# Patient Record
Sex: FEMALE | Race: WHITE | NOT HISPANIC OR LATINO | Employment: OTHER | URBAN - METROPOLITAN AREA
[De-identification: names, ages, dates, MRNs, and addresses within clinical notes are randomized per-mention and may not be internally consistent; named-entity substitution may affect disease eponyms.]

---

## 2017-02-20 PROBLEM — L97.502 DIABETIC ULCER OF FOOT WITH FAT LAYER EXPOSED: Status: ACTIVE | Noted: 2017-02-20

## 2017-02-20 PROBLEM — E11.621 DIABETIC ULCER OF FOOT WITH FAT LAYER EXPOSED: Status: ACTIVE | Noted: 2017-02-20

## 2017-03-17 PROBLEM — M86.60 CHRONIC OSTEOMYELITIS: Status: ACTIVE | Noted: 2017-03-17

## 2017-06-06 PROBLEM — R53.1 WEAKNESS: Status: ACTIVE | Noted: 2017-06-06

## 2017-06-07 PROBLEM — E10.40 DIABETIC NEUROPATHY ASSOCIATED WITH TYPE 1 DIABETES MELLITUS: Status: ACTIVE | Noted: 2017-06-07

## 2017-06-07 PROBLEM — E11.628 TYPE 2 DIABETES MELLITUS WITH SKIN COMPLICATION: Status: ACTIVE | Noted: 2017-06-07

## 2017-06-07 PROBLEM — E87.6 HYPOKALEMIA: Status: ACTIVE | Noted: 2017-06-07

## 2017-06-07 PROBLEM — R53.1 WEAKNESS: Chronic | Status: ACTIVE | Noted: 2017-06-06

## 2017-06-07 PROBLEM — I89.0 LYMPHEDEMA OF BOTH LOWER EXTREMITIES: Status: ACTIVE | Noted: 2017-06-07

## 2017-06-07 PROBLEM — E63.9 INADEQUATE DIETARY ENERGY INTAKE: Status: ACTIVE | Noted: 2017-06-07

## 2017-06-08 PROBLEM — E87.6 HYPOKALEMIA: Status: RESOLVED | Noted: 2017-06-07 | Resolved: 2017-06-08

## 2017-06-08 PROBLEM — L03.119 CELLULITIS OF LEG: Status: ACTIVE | Noted: 2017-06-08

## 2017-06-09 PROBLEM — E10.40 DIABETIC NEUROPATHY ASSOCIATED WITH TYPE 1 DIABETES MELLITUS: Status: RESOLVED | Noted: 2017-06-07 | Resolved: 2017-06-09

## 2017-06-09 PROBLEM — E11.40 DIABETIC NEUROPATHY ASSOCIATED WITH TYPE 2 DIABETES MELLITUS: Status: ACTIVE | Noted: 2017-06-09

## 2017-12-25 PROBLEM — R53.1 GENERALIZED WEAKNESS: Status: ACTIVE | Noted: 2017-12-25

## 2017-12-27 PROBLEM — E87.6 HYPOKALEMIA: Status: ACTIVE | Noted: 2017-12-27

## 2017-12-28 PROBLEM — E87.6 HYPOKALEMIA: Status: RESOLVED | Noted: 2017-12-27 | Resolved: 2017-12-28

## 2018-01-12 PROBLEM — R09.02 HYPOXEMIA: Status: ACTIVE | Noted: 2018-01-12

## 2018-01-12 PROBLEM — N30.00 ACUTE CYSTITIS WITHOUT HEMATURIA: Status: ACTIVE | Noted: 2018-01-12

## 2018-01-12 PROBLEM — I50.23 ACUTE ON CHRONIC SYSTOLIC CONGESTIVE HEART FAILURE: Status: ACTIVE | Noted: 2018-01-12

## 2018-01-14 PROBLEM — R19.7 DIARRHEA: Status: ACTIVE | Noted: 2018-01-14

## 2018-01-14 PROBLEM — R11.0 NAUSEA: Status: ACTIVE | Noted: 2018-01-14

## 2018-01-15 PROBLEM — R11.0 NAUSEA: Status: RESOLVED | Noted: 2018-01-14 | Resolved: 2018-01-15

## 2018-01-15 PROBLEM — R19.7 DIARRHEA: Status: RESOLVED | Noted: 2018-01-14 | Resolved: 2018-01-15

## 2018-01-17 PROBLEM — I10 HBP (HIGH BLOOD PRESSURE): Status: ACTIVE | Noted: 2018-01-17

## 2018-04-08 PROBLEM — R41.82 ALTERED MENTAL STATUS: Status: ACTIVE | Noted: 2018-04-08

## 2018-04-09 PROBLEM — R63.8 INCREASED NUTRITIONAL NEEDS: Status: ACTIVE | Noted: 2018-04-09

## 2018-04-09 PROBLEM — T14.8XXA DEEP TISSUE INJURY: Status: ACTIVE | Noted: 2018-04-09

## 2018-04-09 PROBLEM — N39.0 ACUTE UTI: Status: ACTIVE | Noted: 2018-04-09

## 2018-06-18 PROBLEM — E86.0 DEHYDRATION: Status: ACTIVE | Noted: 2018-06-18

## 2018-06-18 PROBLEM — N17.9 AKI (ACUTE KIDNEY INJURY): Status: ACTIVE | Noted: 2018-06-18

## 2018-10-10 PROBLEM — L89.613 PRESSURE INJURY OF RIGHT HEEL, STAGE 3: Status: ACTIVE | Noted: 2018-10-10

## 2018-10-10 PROBLEM — R63.4 WEIGHT LOSS, UNINTENTIONAL: Status: ACTIVE | Noted: 2018-10-10

## 2018-10-10 PROBLEM — R63.8 ALTERATION IN NUTRITION: Status: ACTIVE | Noted: 2018-10-10

## 2018-10-16 PROBLEM — R63.8 INCREASED NUTRITIONAL NEEDS: Status: ACTIVE | Noted: 2018-10-16

## 2018-11-12 PROBLEM — N39.0 URINARY TRACT INFECTION ASSOCIATED WITH INDWELLING URETHRAL CATHETER: Status: ACTIVE | Noted: 2018-11-12

## 2018-11-12 PROBLEM — T83.511A URINARY TRACT INFECTION ASSOCIATED WITH INDWELLING URETHRAL CATHETER: Status: ACTIVE | Noted: 2018-11-12

## 2018-11-13 PROBLEM — R50.9 FEVER: Status: ACTIVE | Noted: 2018-11-13

## 2018-11-15 PROBLEM — L89.93 PRESSURE INJURY, STAGE 3: Status: ACTIVE | Noted: 2018-11-15

## 2018-11-15 PROBLEM — L89.92 PRESSURE INJURY, STAGE 2: Status: ACTIVE | Noted: 2018-11-15

## 2019-01-22 PROBLEM — L03.90 WOUND CELLULITIS: Status: ACTIVE | Noted: 2019-01-22

## 2019-01-23 PROBLEM — D63.8 ANEMIA OF CHRONIC DISEASE: Status: ACTIVE | Noted: 2019-01-23

## 2019-01-25 PROBLEM — Z89.9 S/P AMPUTATION: Status: ACTIVE | Noted: 2019-01-25

## 2019-03-06 PROBLEM — L03.116 CELLULITIS OF LEFT FOOT: Status: ACTIVE | Noted: 2019-03-06

## 2019-03-07 PROBLEM — I96 TOE NECROSIS: Status: ACTIVE | Noted: 2019-03-07

## 2019-03-08 PROBLEM — L89.95 UNSTAGEABLE PRESSURE INJURY OF SKIN AND TISSUE: Status: ACTIVE | Noted: 2019-03-08

## 2019-03-08 PROBLEM — R53.2 FUNCTIONAL QUADRIPLEGIA: Status: ACTIVE | Noted: 2019-03-08

## 2019-03-26 ENCOUNTER — HOSPITAL ENCOUNTER (EMERGENCY)
Facility: HOSPITAL | Age: 79
Discharge: HOME | End: 2019-03-26
Attending: FAMILY MEDICINE
Payer: MEDICARE

## 2019-03-26 VITALS
DIASTOLIC BLOOD PRESSURE: 60 MMHG | TEMPERATURE: 97 F | HEART RATE: 86 BPM | RESPIRATION RATE: 24 BRPM | SYSTOLIC BLOOD PRESSURE: 121 MMHG | OXYGEN SATURATION: 97 %

## 2019-03-26 DIAGNOSIS — I95.9 HYPOTENSION: Primary | ICD-10-CM

## 2019-03-26 DIAGNOSIS — G89.18 POST-OPERATIVE PAIN: ICD-10-CM

## 2019-03-26 LAB
ALBUMIN SERPL BCP-MCNC: 2.2 G/DL (ref 3.5–5.2)
ALP SERPL-CCNC: 121 U/L (ref 55–135)
ALT SERPL W/O P-5'-P-CCNC: 8 U/L (ref 10–44)
ANION GAP SERPL CALC-SCNC: 6 MMOL/L (ref 8–16)
AST SERPL-CCNC: 15 U/L (ref 10–40)
BACTERIA #/AREA URNS HPF: ABNORMAL /HPF
BASOPHILS # BLD AUTO: 0.03 K/UL (ref 0–0.2)
BASOPHILS NFR BLD: 0.2 % (ref 0–1.9)
BILIRUB SERPL-MCNC: 0.7 MG/DL (ref 0.1–1)
BILIRUB UR QL STRIP: NEGATIVE
BNP SERPL-MCNC: 1020 PG/ML (ref 0–99)
BUN SERPL-MCNC: 8 MG/DL (ref 8–23)
CALCIUM SERPL-MCNC: 9.4 MG/DL (ref 8.7–10.5)
CHLORIDE SERPL-SCNC: 104 MMOL/L (ref 95–110)
CLARITY UR: CLEAR
CO2 SERPL-SCNC: 27 MMOL/L (ref 23–29)
COLOR UR: YELLOW
CREAT SERPL-MCNC: 0.7 MG/DL (ref 0.5–1.4)
DIFFERENTIAL METHOD: ABNORMAL
EOSINOPHIL # BLD AUTO: 0.3 K/UL (ref 0–0.5)
EOSINOPHIL NFR BLD: 2.6 % (ref 0–8)
ERYTHROCYTE [DISTWIDTH] IN BLOOD BY AUTOMATED COUNT: 16.3 % (ref 11.5–14.5)
EST. GFR  (AFRICAN AMERICAN): >60 ML/MIN/1.73 M^2
EST. GFR  (NON AFRICAN AMERICAN): >60 ML/MIN/1.73 M^2
GLUCOSE SERPL-MCNC: 112 MG/DL (ref 70–110)
GLUCOSE UR QL STRIP: NEGATIVE
HCT VFR BLD AUTO: 27.8 % (ref 37–48.5)
HGB BLD-MCNC: 8.9 G/DL (ref 12–16)
HGB UR QL STRIP: NEGATIVE
KETONES UR QL STRIP: NEGATIVE
LEUKOCYTE ESTERASE UR QL STRIP: ABNORMAL
LYMPHOCYTES # BLD AUTO: 1.5 K/UL (ref 1–4.8)
LYMPHOCYTES NFR BLD: 12.2 % (ref 18–48)
MCH RBC QN AUTO: 30.5 PG (ref 27–31)
MCHC RBC AUTO-ENTMCNC: 32 G/DL (ref 32–36)
MCV RBC AUTO: 95 FL (ref 82–98)
MICROSCOPIC COMMENT: ABNORMAL
MONOCYTES # BLD AUTO: 0.8 K/UL (ref 0.3–1)
MONOCYTES NFR BLD: 6.3 % (ref 4–15)
NEUTROPHILS # BLD AUTO: 9.8 K/UL (ref 1.8–7.7)
NEUTROPHILS NFR BLD: 78.7 % (ref 38–73)
NITRITE UR QL STRIP: NEGATIVE
PH UR STRIP: 6 [PH] (ref 5–8)
PLATELET # BLD AUTO: 358 K/UL (ref 150–350)
PMV BLD AUTO: 10 FL (ref 9.2–12.9)
POTASSIUM SERPL-SCNC: 4 MMOL/L (ref 3.5–5.1)
PROT SERPL-MCNC: 6.8 G/DL (ref 6–8.4)
PROT UR QL STRIP: NEGATIVE
RBC # BLD AUTO: 2.92 M/UL (ref 4–5.4)
SODIUM SERPL-SCNC: 137 MMOL/L (ref 136–145)
SP GR UR STRIP: 1.01 (ref 1–1.03)
URN SPEC COLLECT METH UR: ABNORMAL
UROBILINOGEN UR STRIP-ACNC: NEGATIVE EU/DL
WBC # BLD AUTO: 12.49 K/UL (ref 3.9–12.7)
WBC #/AREA URNS HPF: 30 /HPF (ref 0–5)

## 2019-03-26 PROCEDURE — 93010 EKG 12-LEAD: ICD-10-PCS | Mod: ,,, | Performed by: INTERNAL MEDICINE

## 2019-03-26 PROCEDURE — 96361 HYDRATE IV INFUSION ADD-ON: CPT

## 2019-03-26 PROCEDURE — 80053 COMPREHEN METABOLIC PANEL: CPT

## 2019-03-26 PROCEDURE — 36415 COLL VENOUS BLD VENIPUNCTURE: CPT

## 2019-03-26 PROCEDURE — 93005 ELECTROCARDIOGRAM TRACING: CPT

## 2019-03-26 PROCEDURE — 99285 EMERGENCY DEPT VISIT HI MDM: CPT | Mod: 25

## 2019-03-26 PROCEDURE — 96375 TX/PRO/DX INJ NEW DRUG ADDON: CPT

## 2019-03-26 PROCEDURE — 93010 ELECTROCARDIOGRAM REPORT: CPT | Mod: ,,, | Performed by: INTERNAL MEDICINE

## 2019-03-26 PROCEDURE — 83880 ASSAY OF NATRIURETIC PEPTIDE: CPT

## 2019-03-26 PROCEDURE — 25000003 PHARM REV CODE 250: Performed by: FAMILY MEDICINE

## 2019-03-26 PROCEDURE — 85025 COMPLETE CBC W/AUTO DIFF WBC: CPT

## 2019-03-26 PROCEDURE — 96365 THER/PROPH/DIAG IV INF INIT: CPT

## 2019-03-26 PROCEDURE — 81000 URINALYSIS NONAUTO W/SCOPE: CPT

## 2019-03-26 PROCEDURE — 63600175 PHARM REV CODE 636 W HCPCS: Performed by: FAMILY MEDICINE

## 2019-03-26 RX ORDER — AMLODIPINE BESYLATE 5 MG/1
2.5 TABLET ORAL DAILY
Qty: 30 TABLET | Refills: 0 | Status: ON HOLD | OUTPATIENT
Start: 2019-03-26 | End: 2019-04-08 | Stop reason: HOSPADM

## 2019-03-26 RX ORDER — MEPERIDINE HYDROCHLORIDE 25 MG/ML
12.5 INJECTION INTRAMUSCULAR; INTRAVENOUS; SUBCUTANEOUS
Status: COMPLETED | OUTPATIENT
Start: 2019-03-26 | End: 2019-03-26

## 2019-03-26 RX ADMIN — SODIUM CHLORIDE 500 ML: 0.9 INJECTION, SOLUTION INTRAVENOUS at 02:03

## 2019-03-26 RX ADMIN — PROMETHAZINE HYDROCHLORIDE 6.25 MG: 25 INJECTION INTRAMUSCULAR; INTRAVENOUS at 02:03

## 2019-03-26 RX ADMIN — MEPERIDINE HYDROCHLORIDE 12.5 MG: 25 INJECTION INTRAMUSCULAR; INTRAVENOUS; SUBCUTANEOUS at 02:03

## 2019-03-26 NOTE — ED PROVIDER NOTES
Encounter Date: 3/26/2019       History     Chief Complaint   Patient presents with    Hypotension    Foot Pain     left     The patient is an 78-year-old female who is brought to the emergency department by ambulance with complaints hypotension and for significantly according to the patient left foot pain. Patient states that she has had pain since she got home from surgery where she had  transmetatarsal amputation of the digits of the left foot.  The family reports that the patient is weak and having difficulty with getting up and moving around.  They report consistently low blood pressures.  The patient denies any chest or shortness of breath. Patient does have a significant past medical history of congestive heart failure.  Upon evaluation of the foot in question the left foot does have sutures in place and there is some erythema around the wound edges with no sign of purulent drainage there is trace bloody seepage from the wound at the lateral aspect.  The wound edges are closely approximated.  Foot is tender with palpation around the wound.        Review of patient's allergies indicates:   Allergen Reactions    Amoxicillin Nausea And Vomiting     Past Medical History:   Diagnosis Date    Arthritis     Cancer     left breast    CHF (congestive heart failure)     Conjunctivitis     Depression     Diabetes mellitus type I     GERD (gastroesophageal reflux disease)     Hypertension     Lymphedema     Myoneural disorder     Neuromuscular disorder     Neuropathy      Past Surgical History:   Procedure Laterality Date    AMPUTATION, FOOT, TRANSMETATARSAL Left 3/12/2019    Performed by Thania Stanton III, DPM at [facility]    AMPUTATION, TOE - 2nd Left 1/24/2019    Performed by KATINA Atkinson II, MD at [facility]    AMPUTATION-TOE LEFT HALLUX METATARSOPHALANGEL Left 3/17/2017    Performed by Thania Stanton III, DPM at [facility]    APPENDECTOMY      BACK SURGERY      BREAST SURGERY Left      lumpectomy/radiation    CARDIAC SURGERY      CERVICAL FUSION      CHOLECYSTECTOMY      JOINT REPLACEMENT      right knee    SPINE SURGERY      THYROID LOBECTOMY      TOE AMPUTATION Right      Family History   Problem Relation Age of Onset    Cancer Father     Cancer Brother      Social History     Tobacco Use    Smoking status: Never Smoker    Smokeless tobacco: Never Used   Substance Use Topics    Alcohol use: No    Drug use: No     Review of Systems   Constitutional: Negative for fever.   HENT: Negative for congestion and sore throat.    Respiratory: Negative for cough and shortness of breath.    Cardiovascular: Negative for chest pain, palpitations and leg swelling.   Gastrointestinal: Negative for nausea.   Genitourinary: Negative for dysuria.   Musculoskeletal: Positive for gait problem (Increased weakness and digital amputations of bilateral feet). Negative for back pain, neck pain and neck stiffness.   Skin: Negative for color change, pallor and rash.   Neurological: Positive for weakness.   Hematological: Does not bruise/bleed easily.   All other systems reviewed and are negative.      Physical Exam     Initial Vitals   BP Pulse Resp Temp SpO2   03/26/19 1249 03/26/19 1249 03/26/19 1249 03/26/19 1249 03/26/19 1317   (!) 102/58 81 20 96.1 °F (35.6 °C) 97 %      MAP       --                Physical Exam    Nursing note and vitals reviewed.  Constitutional: Vital signs are normal. She is not diaphoretic. She is Obese . She is sleeping and cooperative.  Non-toxic appearance. She has a sickly appearance. She does not appear ill. No distress.   HENT:   Head: Normocephalic and atraumatic.   Eyes: Conjunctivae are normal.   Neck: Normal range of motion. Neck supple. No JVD present.   Cardiovascular: Normal rate, regular rhythm and normal heart sounds.   Pulmonary/Chest: Breath sounds normal. No respiratory distress. She has no wheezes. She has no rales.   Musculoskeletal: She exhibits no edema.    Patient has closed wound to the left foot status post transmetatarsal amputation of the digits on the left foot.  The wound edges were closely approximated there is a scant amount of bloody drainage there is some erythema around the wound edges not draining any purulent material.     The patient complains of generalized weakness however she does have full range of motion with no significant contracture or other disability.     Also note previous digital amputations from the right foot as well.   Neurological: She is alert.   Skin: Skin is warm and dry. Capillary refill takes 2 to 3 seconds.   Psychiatric: She has a normal mood and affect.         ED Course   Procedures  Labs Reviewed   URINALYSIS - Abnormal; Notable for the following components:       Result Value    Leukocytes, UA 2+ (*)     All other components within normal limits   CBC W/ AUTO DIFFERENTIAL - Abnormal; Notable for the following components:    RBC 2.92 (*)     Hemoglobin 8.9 (*)     Hematocrit 27.8 (*)     RDW 16.3 (*)     Platelets 358 (*)     Gran # (ANC) 9.8 (*)     Gran% 78.7 (*)     Lymph% 12.2 (*)     All other components within normal limits   COMPREHENSIVE METABOLIC PANEL - Abnormal; Notable for the following components:    Glucose 112 (*)     Albumin 2.2 (*)     ALT 8 (*)     Anion Gap 6 (*)     All other components within normal limits   B-TYPE NATRIURETIC PEPTIDE - Abnormal; Notable for the following components:    BNP 1,020 (*)     All other components within normal limits   URINALYSIS MICROSCOPIC - Abnormal; Notable for the following components:    WBC, UA 30 (*)     Bacteria, UA Few (*)     All other components within normal limits          Imaging Results          X-Ray Chest AP Portable (Final result)  Result time 03/26/19 14:32:17    Final result by Radha Camarena MD (03/26/19 14:32:17)                 Impression:      As above.      Electronically signed by: Radha Camarena MD  Date:    03/26/2019  Time:    14:32              Narrative:    EXAMINATION:  XR CHEST AP PORTABLE    CLINICAL HISTORY:  Hypotension, unspecified    TECHNIQUE:  Single frontal view of the chest was performed.    COMPARISON:  10/09/2018    FINDINGS:  Cardiomegaly.  Calcifications of the aortic knob.  No consolidation or pleural effusions.  The bones are osteopenic and show age-appropriate degenerative change.                                                      Clinical Impression:       ICD-10-CM ICD-9-CM   1. Hypotension I95.9 458.9   2. Post-operative pain G89.18 338.18         Disposition:   Disposition: Discharged  Condition: Stable  Patient discharged home, continue current care plan schedule follow-up with primary care provider for further evaluation and treatment.                         Miguel Mirza MD  03/26/19 [id number]

## 2019-03-26 NOTE — ED TRIAGE NOTES
Patient presents to the ER via EMS after being called from clinic ([facility]) with patient being hypotensive.  Patient is calm and cooperative at this time with c/o generalized weakness.

## 2019-03-26 NOTE — DISCHARGE INSTRUCTIONS
Go home and continue current medications with the exception of amlodipine decreased dose to half which is currently taking.  Contact her primary care provider for further evaluation and management.  Make sure that she follow up with Dr. Stanton for wound checks. Return to ER for any new or worsening conditions.

## 2019-04-03 PROBLEM — T81.89XA NONHEALING SURGICAL WOUND: Status: ACTIVE | Noted: 2019-04-03

## 2019-04-03 PROBLEM — E11.621 TYPE 2 DIABETES MELLITUS WITH FOOT ULCER, WITH LONG-TERM CURRENT USE OF INSULIN: Status: ACTIVE | Noted: 2019-04-03

## 2019-04-03 PROBLEM — L97.509 TYPE 2 DIABETES MELLITUS WITH FOOT ULCER, WITH LONG-TERM CURRENT USE OF INSULIN: Status: ACTIVE | Noted: 2019-04-03

## 2019-04-03 PROBLEM — Z79.4 TYPE 2 DIABETES MELLITUS WITH FOOT ULCER, WITH LONG-TERM CURRENT USE OF INSULIN: Status: ACTIVE | Noted: 2019-04-03

## 2019-04-04 PROBLEM — L89.94 PRESSURE INJURY, STAGE 4: Status: ACTIVE | Noted: 2019-04-04

## 2019-04-04 PROBLEM — L89.610 UNSTAGEABLE PRESSURE ULCER OF RIGHT HEEL: Status: ACTIVE | Noted: 2019-04-04

## 2019-05-17 PROBLEM — I96 GANGRENE OF LEFT FOOT: Status: ACTIVE | Noted: 2019-05-17

## 2019-05-18 PROBLEM — D62 ACUTE BLOOD LOSS ANEMIA: Status: ACTIVE | Noted: 2019-05-18

## 2019-05-22 PROBLEM — I77.1 ARTERIAL INSUFFICIENCY WITH ISCHEMIC ULCER: Status: ACTIVE | Noted: 2019-05-22

## 2019-05-22 PROBLEM — L98.499 ARTERIAL INSUFFICIENCY WITH ISCHEMIC ULCER: Status: ACTIVE | Noted: 2019-05-22

## 2019-06-22 PROBLEM — J90 CHRONIC BILATERAL PLEURAL EFFUSIONS: Status: ACTIVE | Noted: 2019-06-22

## 2019-06-24 PROBLEM — S81.802A WOUND OF LEFT LEG: Status: ACTIVE | Noted: 2019-06-24

## 2019-06-24 PROBLEM — A04.72 CLOSTRIDIUM DIFFICILE DIARRHEA: Status: ACTIVE | Noted: 2019-06-24

## 2019-06-24 PROBLEM — R32 INCONTINENCE ASSOCIATED DERMATITIS: Status: ACTIVE | Noted: 2019-06-24

## 2019-06-24 PROBLEM — I77.1 ARTERIAL INSUFFICIENCY: Status: ACTIVE | Noted: 2019-06-24

## 2019-06-24 PROBLEM — I50.43 ACUTE ON CHRONIC COMBINED SYSTOLIC AND DIASTOLIC HEART FAILURE: Status: ACTIVE | Noted: 2018-01-12

## 2019-06-24 PROBLEM — R21 RASH: Status: ACTIVE | Noted: 2019-06-24

## 2019-06-24 PROBLEM — L25.8 INCONTINENCE ASSOCIATED DERMATITIS: Status: ACTIVE | Noted: 2019-06-24

## 2019-06-25 PROBLEM — G93.49 INFECTIOUS ENCEPHALOPATHY: Status: ACTIVE | Noted: 2019-06-25

## 2019-06-25 PROBLEM — B99.9 INFECTIOUS ENCEPHALOPATHY: Status: ACTIVE | Noted: 2019-06-25

## 2019-07-03 PROBLEM — S78.112A ABOVE KNEE AMPUTATION OF LEFT LOWER EXTREMITY: Status: ACTIVE | Noted: 2019-07-03

## 2019-08-18 PROBLEM — S82.401A TIBIA/FIBULA FRACTURE, RIGHT, CLOSED, INITIAL ENCOUNTER: Status: ACTIVE | Noted: 2019-01-01

## 2019-08-18 PROBLEM — S82.201A TIBIA/FIBULA FRACTURE, RIGHT, CLOSED, INITIAL ENCOUNTER: Status: ACTIVE | Noted: 2019-01-01

## 2020-01-10 PROBLEM — I50.9 CONGESTIVE HEART FAILURE: Status: ACTIVE | Noted: 2020-01-01

## 2020-01-17 PROBLEM — I25.5 ISCHEMIC CARDIOMYOPATHY: Status: ACTIVE | Noted: 2020-01-01

## 2020-05-03 PROBLEM — E11.8 DIABETIC FOOT: Status: ACTIVE | Noted: 2020-01-01

## 2020-05-04 PROBLEM — I73.9 PVD (PERIPHERAL VASCULAR DISEASE): Status: ACTIVE | Noted: 2020-01-01

## 2020-05-19 PROBLEM — I96 GANGRENE: Status: ACTIVE | Noted: 2020-01-01

## 2020-05-25 PROBLEM — A49.8 PROTEUS MIRABILIS INFECTION: Status: ACTIVE | Noted: 2020-01-01

## 2020-05-25 PROBLEM — A49.8 PSEUDOMONAS AERUGINOSA INFECTION: Status: ACTIVE | Noted: 2020-01-01

## 2020-06-17 PROBLEM — T81.30XA WOUND DEHISCENCE: Status: ACTIVE | Noted: 2020-01-01

## 2020-07-14 PROBLEM — E11.21 DIABETIC KIDNEY DISEASE: Status: ACTIVE | Noted: 2020-01-01

## 2020-07-15 PROBLEM — E83.42 HYPOMAGNESEMIA: Status: ACTIVE | Noted: 2020-01-01

## 2020-07-15 PROBLEM — R23.9 IMPAIRED SKIN INTEGRITY: Status: ACTIVE | Noted: 2020-01-01

## 2020-07-26 PROBLEM — A41.9 SEPTIC SHOCK: Status: ACTIVE | Noted: 2020-01-01

## 2020-07-26 PROBLEM — A41.9 SEPSIS: Status: ACTIVE | Noted: 2020-01-01

## 2020-07-26 PROBLEM — R65.21 SEPTIC SHOCK: Status: ACTIVE | Noted: 2020-01-01

## 2020-07-26 PROBLEM — J96.00 ACUTE RESPIRATORY FAILURE: Status: ACTIVE | Noted: 2020-01-01

## 2020-07-26 PROBLEM — I48.91 NEW ONSET A-FIB: Status: ACTIVE | Noted: 2020-01-01

## 2020-07-26 PROBLEM — E87.0 HYPERNATREMIA: Status: ACTIVE | Noted: 2020-01-01

## 2020-07-27 PROBLEM — J15.9 BACTERIAL PNEUMONIA: Status: ACTIVE | Noted: 2020-01-01

## 2020-07-27 PROBLEM — R65.20 SEVERE SEPSIS: Status: ACTIVE | Noted: 2020-01-01

## 2020-08-04 PROBLEM — G93.40 ENCEPHALOPATHY: Status: ACTIVE | Noted: 2019-06-25

## 2020-08-07 PROBLEM — B37.9 CANDIDA ALBICANS INFECTION: Status: ACTIVE | Noted: 2020-01-01
